# Patient Record
Sex: FEMALE | Race: WHITE | NOT HISPANIC OR LATINO | URBAN - METROPOLITAN AREA
[De-identification: names, ages, dates, MRNs, and addresses within clinical notes are randomized per-mention and may not be internally consistent; named-entity substitution may affect disease eponyms.]

---

## 2017-02-07 ENCOUNTER — COMMUNICATION - HEALTHEAST (OUTPATIENT)
Dept: SURGERY | Facility: CLINIC | Age: 62
End: 2017-02-07

## 2017-02-07 DIAGNOSIS — K21.9 ACID REFLUX: ICD-10-CM

## 2017-02-07 DIAGNOSIS — Z98.84 S/P LAPAROSCOPIC SLEEVE GASTRECTOMY: ICD-10-CM

## 2017-05-22 ENCOUNTER — COMMUNICATION - HEALTHEAST (OUTPATIENT)
Dept: SURGERY | Facility: CLINIC | Age: 62
End: 2017-05-22

## 2017-05-23 ENCOUNTER — COMMUNICATION - HEALTHEAST (OUTPATIENT)
Dept: SURGERY | Facility: CLINIC | Age: 62
End: 2017-05-23

## 2017-07-13 ENCOUNTER — OFFICE VISIT (OUTPATIENT)
Dept: ORTHOPEDICS | Facility: CLINIC | Age: 62
End: 2017-07-13
Payer: COMMERCIAL

## 2017-07-13 VITALS
WEIGHT: 150 LBS | DIASTOLIC BLOOD PRESSURE: 82 MMHG | BODY MASS INDEX: 26.58 KG/M2 | SYSTOLIC BLOOD PRESSURE: 132 MMHG | HEIGHT: 63 IN

## 2017-07-13 DIAGNOSIS — M65.311 TRIGGER FINGER OF ALL DIGITS OF BOTH HANDS: Primary | ICD-10-CM

## 2017-07-13 DIAGNOSIS — M65.312 TRIGGER FINGER OF ALL DIGITS OF BOTH HANDS: Primary | ICD-10-CM

## 2017-07-13 DIAGNOSIS — M65.351 TRIGGER FINGER OF ALL DIGITS OF BOTH HANDS: Primary | ICD-10-CM

## 2017-07-13 DIAGNOSIS — M65.332 TRIGGER FINGER OF ALL DIGITS OF BOTH HANDS: Primary | ICD-10-CM

## 2017-07-13 DIAGNOSIS — M65.352 TRIGGER FINGER OF ALL DIGITS OF BOTH HANDS: Primary | ICD-10-CM

## 2017-07-13 DIAGNOSIS — M65.331 TRIGGER FINGER OF ALL DIGITS OF BOTH HANDS: Primary | ICD-10-CM

## 2017-07-13 DIAGNOSIS — M65.321 TRIGGER FINGER OF ALL DIGITS OF BOTH HANDS: Primary | ICD-10-CM

## 2017-07-13 DIAGNOSIS — M65.322 TRIGGER FINGER OF ALL DIGITS OF BOTH HANDS: Primary | ICD-10-CM

## 2017-07-13 DIAGNOSIS — M65.342 TRIGGER FINGER OF ALL DIGITS OF BOTH HANDS: Primary | ICD-10-CM

## 2017-07-13 DIAGNOSIS — M65.342 TRIGGER RING FINGER OF LEFT HAND: ICD-10-CM

## 2017-07-13 DIAGNOSIS — M65.341 TRIGGER FINGER OF ALL DIGITS OF BOTH HANDS: Primary | ICD-10-CM

## 2017-07-13 PROCEDURE — 99204 OFFICE O/P NEW MOD 45 MIN: CPT | Mod: 25 | Performed by: FAMILY MEDICINE

## 2017-07-13 PROCEDURE — 20550 NJX 1 TENDON SHEATH/LIGAMENT: CPT | Performed by: FAMILY MEDICINE

## 2017-07-13 RX ORDER — MAGNESIUM 200 MG
1000 TABLET ORAL
COMMUNITY

## 2017-07-13 RX ORDER — METOPROLOL SUCCINATE 25 MG/1
25 TABLET, EXTENDED RELEASE ORAL
COMMUNITY
Start: 2014-02-27 | End: 2017-07-28

## 2017-07-13 RX ORDER — LEVOTHYROXINE SODIUM 50 UG/1
50 TABLET ORAL
COMMUNITY
Start: 2016-09-21 | End: 2017-07-28

## 2017-07-13 RX ORDER — CHLORAL HYDRATE 500 MG
1 CAPSULE ORAL
COMMUNITY
End: 2017-07-28

## 2017-07-13 RX ORDER — LANOLIN ALCOHOL/MO/W.PET/CERES
400 CREAM (GRAM) TOPICAL
COMMUNITY
End: 2017-07-28

## 2017-07-13 RX ORDER — CLOPIDOGREL BISULFATE 75 MG/1
75 TABLET ORAL
COMMUNITY
Start: 2014-02-27

## 2017-07-13 RX ORDER — LISINOPRIL 10 MG/1
10 TABLET ORAL
COMMUNITY
End: 2017-07-28

## 2017-07-13 RX ORDER — ATORVASTATIN CALCIUM 10 MG/1
10 TABLET, FILM COATED ORAL
COMMUNITY

## 2017-07-13 NOTE — NURSING NOTE
"Chief Complaint   Patient presents with     Musculoskeletal Problem     left ring finger - trigger finger        Initial /82  Ht 5' 3\" (1.6 m)  Wt 150 lb (68 kg)  BMI 26.57 kg/m2 Estimated body mass index is 26.57 kg/(m^2) as calculated from the following:    Height as of this encounter: 5' 3\" (1.6 m).    Weight as of this encounter: 150 lb (68 kg).  Medication Reconciliation: complete     Manuelito Whaley ATC  "

## 2017-07-13 NOTE — PROGRESS NOTES
"Ysabel Tucker  :  1955  DOS: 2017  MRN: [id number]    Sports Medicine Clinic Visit    PCP: No primary care provider on file.    Ysabel Tucker is a 62 year old Right hand dominant female who is seen as a self referral presenting with left ring and index finger pain.     Injury: Gradual onset of triggering sensation in left ring finger over last 3 months, mild trigger in index finger over last 2 weeks.  Pain located over left ring finger and index finger, nonradiating.  Additional Features:  Positive: flexion contracture.  Symptoms are better with Rest and steroid injections.  Symptoms are worse with: waking in AM, gripping/grasping objects.  Other evaluation and/or treatments so far consists of: Ibuprofen, Rest and steroid injection.  Recent imaging completed: No recent imaging completed.  Prior History of related problems: Most recent steroid injection in left ring finger in 2016 - provided ~ 3 months of relief.  H/o of trigger finger release in right index - ring fingers, left index & middle fingers by Dr Oglesby @ [facility].    Social History: works as  staff    Review of Systems  Musculoskeletal: as above  Remainder of review of systems is negative including constitutional, CV, pulmonary, GI, Skin and Neurologic except as noted in HPI or medical history.    No past medical history on file.  No past surgical history on file.    Objective  /82  Ht 5' 3\" (1.6 m)  Wt 150 lb (68 kg)  BMI 26.57 kg/m2    General: healthy, alert and in no distress    HEENT: no scleral icterus or conjunctival erythema   Skin: no suspicious lesions or rash. No jaundice.   CV: regular rhythm by palpation, 2+ distal pulses, no pedal edema    Resp: normal respiratory effort without conversational dyspnea   Psych: normal mood and affect    Gait: nonantalgic, appropriate coordination and balance   Neuro: normal light touch sensory exam of the extremities. Motor strength as noted below     Left " hand exam  Inspection:Long and index Finger:  slight swelling, over MCP, flexor tendon nodule, prior sugical scar over palmar MCP of index finger  Tender: Long and index Finger:   MCP joint, triggering, A1 Pulley  Non-tender: remainder of hand  Range of Motion Long Finger and index:   Painful full extension, triggering of A1 pulley with flexion  Strength: full strength    Procedure  After risks, benefits and complications of steroid injection were discussed with the patient, a consent form was signed and the patient elected to proceed.  Using sterile technique, the area was first prepped with betadyne and an alcohol swab.  A 25 gauge  needle was used to inject a mixture of 20 mg Kenalog  1 ml of 1% lidocaine into the left 2nd A1 pulley at the level of the MCP joint..  The patient tolerated the procedure well without complications.      Radiology:  No new imaging today    Assessment:  1. Trigger finger of all digits of both hands    2. Trigger ring finger of left hand        Plan:  Discussed the assessment with the patient.  Follow up: prn  Injection to the left 2nd trigger finger today  She has had a hand surgeon in the past managing who is no longer covered by insurance  Will consult Dr Ferreira or Carolyne from [facility] for ongoing surgical options  9 of her 10 fingers have had debridement surgery, and all have been injected multiple time.    Left 4th digit is most focal, has been injected multiple times, sore nodule at MCP and PIP, will discuss surical options  Expectations and goals of CSI reviewed  Often 2-3 days for steroid effect, and can take up to two weeks for maximum effect  We discussed modified progressive pain-free activity as tolerated  Do not overuse in first two weeks if feeling better due to concern for vulnerability while steroid is working  Supportive care reviewed  All questions were answered today  Contact us with additional questions or concerns  Signs and sx of concern reviewed      Joni Quiles DO,  [facility]  Primary Care Sports Medicine  [facility]             Disclaimer: This note consists of symbols derived from keyboarding, dictation and/or voice recognition software. As a result, there may be errors in the script that have gone undetected. Please consider this when interpreting information found in this chart.

## 2017-07-13 NOTE — MR AVS SNAPSHOT
After Visit Summary   7/13/2017    Ysabel Tucker    MRN: [id number]           Patient Information     Date Of Birth          1955        Visit Information        Provider Department      7/13/2017 3:20 PM Joni Quiles DO [facility]        Today's Diagnoses     Trigger finger of all digits of both hands    -  1    Trigger ring finger of left hand           Follow-ups after your visit        Additional Services     ORTHO  REFERRAL       [facility] is referring you to the Orthopedic  Services at [facility].       The  Representative will assist you in the coordination of your Orthopedic and Musculoskeletal Care as prescribed by your physician.    The  Representative will call you within 1 business day to help schedule your appointment, or you may contact the  Representative at:    All areas ~ [phone]     Type of Referral : Surgical / Specialist - Hand Surgeon, Dr Barfield or Dr Ferreira      Timeframe requested: Routine    Coverage of these services is subject to the terms and limitations of your health insurance plan.  Please call member services at your health plan with any benefit or coverage questions.      If X-rays, CT or MRI's have been performed, please contact the facility where they were done to arrange for , prior to your scheduled appointment.  Please bring this referral request to your appointment and present it to your specialist.                  Who to contact     If you have questions or need follow up information about today's clinic visit or your schedule please contact [facility] directly at [phone].  Normal or non-critical lab and imaging results will be communicated to you by MyChart, letter or phone within 4 business days after the clinic has received the results. If you do not hear from us within 7  "days, please contact the clinic through [facility]PU or phone. If you have a critical or abnormal lab result, we will notify you by phone as soon as possible.  Submit refill requests through ARPU or call your pharmacy and they will forward the refill request to us. Please allow 3 business days for your refill to be completed.          Additional Information About Your Visit        ARPU Information     ARPU lets you send messages to your doctor, view your test results, renew your prescriptions, schedule appointments and more. To sign up, go to [email or web address] . Click on \"Log in\" on the left side of the screen, which will take you to the Welcome page. Then click on \"Sign up Now\" on the right side of the page.     You will be asked to enter the access code listed below, as well as some personal information. Please follow the directions to create your username and password.     Your access code is: [email or web address]  Expires: 10/12/2017 12:04 PM     Your access code will  in 90 days. If you need help or a new code, please call your [place] clinic or [phone].        Care EveryWhere ID     This is your Care EveryWhere ID. This could be used by other organizations to access your [place] medical records  [id number]        Your Vitals Were     Height BMI (Body Mass Index)                5' 3\" (1.6 m) 26.57 kg/m2           Blood Pressure from Last 3 Encounters:   17 132/82    Weight from Last 3 Encounters:   17 150 lb (68 kg)              We Performed the Following     INJECTION SINGLE TENDON SHEATH/LIGAMENT     ORTHO  REFERRAL     TRIAMCINOLONE ACET INJ NOS          Today's Medication Changes          These changes are accurate as of: 17 11:59 PM.  If you have any questions, ask your nurse or doctor.               Start taking these medicines.        Dose/Directions    triamcinolone acetonide 40 MG/ML injection   Commonly known as:  KENALOG-40   Used for:  Trigger ring finger " of left hand        Dose:  20 mg   Inject 0.5 mLs (20 mg) into the muscle once for 1 dose   Quantity:  0.5 mL   Refills:  0            Where to get your medicines      Some of these will need a paper prescription and others can be bought over the counter.  Ask your nurse if you have questions.     You don't need a prescription for these medications     triamcinolone acetonide 40 MG/ML injection                Primary Care Provider    None Specified       No primary provider on file.        Equal Access to Services     ARDEN CARPENTER : Lisha Moses, aram rand, qalaine kaalmaanastasiya booker, faith giraldorosalindazeny gonzalez . So [facility] [phone].    ATENCIÓN: Si chu calderon, tiene a jovel disposición servicios gratuitos de asistencia lingüística. Llame al [phone].    We comply with applicable federal civil rights laws and [place] laws. We do not discriminate on the basis of race, color, national origin, age, disability sex, sexual orientation or gender identity.            Thank you!     Thank you for choosing [facility]  for your care. Our goal is always to provide you with excellent care. Hearing back from our patients is one way we can continue to improve our services. Please take a few minutes to complete the written survey that you may receive in the mail after your visit with us. Thank you!             Your Updated Medication List - Protect others around you: Learn how to safely use, store and throw away your medicines at www.disposemymeds.org.          This list is accurate as of: 7/13/17 11:59 PM.  Always use your most recent med list.                   Brand Name Dispense Instructions for use Diagnosis    atorvastatin 10 MG tablet    LIPITOR     Take 10 mg by mouth        clopidogrel 75 MG tablet    PLAVIX     Take 75 mg by mouth        cyanocobalamin 1000 MCG Subl sublingual tablet      Place 1,000 mcg under the tongue        fish oil-omega-3  fatty acids 1000 MG capsule      Take 1 capsule by mouth        folic acid 400 MCG tablet    FOLVITE     Take 400 mcg by mouth        levothyroxine 50 MCG tablet    SYNTHROID/LEVOTHROID     Take 50 mcg by mouth        lisinopril 10 MG tablet    PRINIVIL/ZESTRIL     Take 10 mg by mouth        metFORMIN 1000 MG tablet    GLUCOPHAGE     Take 1,000 mg by mouth        metoprolol 25 MG 24 hr tablet    TOPROL-XL     Take 25 mg by mouth        triamcinolone acetonide 40 MG/ML injection    KENALOG-40    0.5 mL    Inject 0.5 mLs (20 mg) into the muscle once for 1 dose    Trigger ring finger of left hand

## 2017-07-14 RX ORDER — TRIAMCINOLONE ACETONIDE 40 MG/ML
20 INJECTION, SUSPENSION INTRA-ARTICULAR; INTRAMUSCULAR ONCE
Qty: 0.5 ML | Refills: 0 | OUTPATIENT
Start: 2017-07-14 | End: 2017-07-14

## 2017-07-20 ENCOUNTER — TRANSFERRED RECORDS (OUTPATIENT)
Dept: HEALTH INFORMATION MANAGEMENT | Facility: CLINIC | Age: 62
End: 2017-07-20

## 2017-07-28 RX ORDER — LEVOTHYROXINE SODIUM 75 UG/1
75 TABLET ORAL DAILY
Refills: 1 | COMMUNITY
Start: 2017-05-11

## 2017-07-28 RX ORDER — METOPROLOL TARTRATE 50 MG
50 TABLET ORAL
COMMUNITY
Start: 2016-11-22

## 2017-07-28 RX ORDER — AMLODIPINE BESYLATE 10 MG/1
10 TABLET ORAL
COMMUNITY

## 2017-07-28 RX ORDER — FUROSEMIDE 20 MG
20 TABLET ORAL
COMMUNITY

## 2017-07-28 RX ORDER — TRAMADOL HYDROCHLORIDE 50 MG/1
50-100 TABLET ORAL
COMMUNITY
Start: 2015-07-14

## 2017-08-01 ENCOUNTER — HOSPITAL ENCOUNTER (OUTPATIENT)
Facility: CLINIC | Age: 62
Discharge: HOME | End: 2017-08-01
Attending: ORTHOPAEDIC SURGERY | Admitting: ORTHOPAEDIC SURGERY
Payer: COMMERCIAL

## 2017-08-01 ENCOUNTER — SURGERY (OUTPATIENT)
Age: 62
End: 2017-08-01

## 2017-08-01 VITALS
TEMPERATURE: 98 F | HEART RATE: 58 BPM | RESPIRATION RATE: 16 BRPM | OXYGEN SATURATION: 99 % | SYSTOLIC BLOOD PRESSURE: 135 MMHG | HEIGHT: 63 IN | BODY MASS INDEX: 26.58 KG/M2 | WEIGHT: 150 LBS | DIASTOLIC BLOOD PRESSURE: 87 MMHG

## 2017-08-01 LAB — GLUCOSE BLDC GLUCOMTR-MCNC: 140 MG/DL (ref 70–99)

## 2017-08-01 PROCEDURE — 27210794 ZZH OR GENERAL SUPPLY STERILE: Performed by: ORTHOPAEDIC SURGERY

## 2017-08-01 PROCEDURE — 25000128 H RX IP 250 OP 636: Performed by: PHYSICIAN ASSISTANT

## 2017-08-01 PROCEDURE — 40000305 ZZH STATISTIC PRE PROC ASSESS I: Performed by: ORTHOPAEDIC SURGERY

## 2017-08-01 PROCEDURE — 71000027 ZZH RECOVERY PHASE 2 EACH 15 MINS: Performed by: ORTHOPAEDIC SURGERY

## 2017-08-01 PROCEDURE — 82962 GLUCOSE BLOOD TEST: CPT

## 2017-08-01 PROCEDURE — 36000050 ZZH SURGERY LEVEL 2 1ST 30 MIN: Performed by: ORTHOPAEDIC SURGERY

## 2017-08-01 PROCEDURE — 25000125 ZZHC RX 250: Performed by: ORTHOPAEDIC SURGERY

## 2017-08-01 PROCEDURE — S0020 INJECTION, BUPIVICAINE HYDRO: HCPCS | Performed by: ORTHOPAEDIC SURGERY

## 2017-08-01 RX ORDER — CEFAZOLIN SODIUM 2 G/100ML
2 INJECTION, SOLUTION INTRAVENOUS
Status: COMPLETED | OUTPATIENT
Start: 2017-08-01 | End: 2017-08-01

## 2017-08-01 RX ORDER — BACITRACIN ZINC 500 [USP'U]/G
OINTMENT TOPICAL PRN
Status: DISCONTINUED | OUTPATIENT
Start: 2017-08-01 | End: 2017-08-01 | Stop reason: HOSPADM

## 2017-08-01 RX ORDER — BUPIVACAINE HYDROCHLORIDE 5 MG/ML
INJECTION, SOLUTION PERINEURAL PRN
Status: DISCONTINUED | OUTPATIENT
Start: 2017-08-01 | End: 2017-08-01 | Stop reason: HOSPADM

## 2017-08-01 RX ORDER — LIDOCAINE HYDROCHLORIDE 10 MG/ML
INJECTION, SOLUTION INFILTRATION; PERINEURAL PRN
Status: DISCONTINUED | OUTPATIENT
Start: 2017-08-01 | End: 2017-08-01 | Stop reason: HOSPADM

## 2017-08-01 RX ORDER — CEFAZOLIN SODIUM 1 G/3ML
1 INJECTION, POWDER, FOR SOLUTION INTRAMUSCULAR; INTRAVENOUS SEE ADMIN INSTRUCTIONS
Status: DISCONTINUED | OUTPATIENT
Start: 2017-08-01 | End: 2017-08-01 | Stop reason: HOSPADM

## 2017-08-01 RX ADMIN — BUPIVACAINE HYDROCHLORIDE 2.5 ML: 5 INJECTION, SOLUTION PERINEURAL at 13:50

## 2017-08-01 RX ADMIN — CEFAZOLIN SODIUM 2 G: 2 INJECTION, SOLUTION INTRAVENOUS at 13:32

## 2017-08-01 RX ADMIN — OSELTAMIVIR PHOSPHATE 14 G: 75 CAPSULE ORAL at 13:50

## 2017-08-01 RX ADMIN — LIDOCAINE HYDROCHLORIDE 2.5 ML: 10 INJECTION, SOLUTION INFILTRATION; PERINEURAL at 13:51

## 2017-08-01 NOTE — OP NOTE
Date of Procedure: 08/01/17     Pre-operative Diagnosis:  Left ring finger stenosing tenosynovitis   Post-operative Diagnosis: same    Procedures performed:  Left ring finger A1 pulley release    Surgeon: Christina Barfield M.D.  Assistants: Sherrie Houston     Anesthesia:  Local anesthetic for a digital block to the left ring finger  Specimens: none   EBL: minimal  Drains: none   Complications: none known    Findings: No residual triggering with active flexion of the digit    Indications for Surgery: Ysabel Tucker is a 62 year old female with a history of left ring finger trigger digit that has failed to respond to conservative treatment including previous corticosteroid injections.  I spoke to the patient about the risks, benefits, and alternatives of continued conservative treatment verses surgical intervention.  The patient understands the risks of surgery include, but are not limited to the following: bleeding, infection, damage to the adjacent neurovascular structures, persistent or recurrent triggering, numbness, weakness, chronic pain, wound healing problems, failure to relieve all of the symptoms, worsening symptoms, recurrence of symptoms, stiffness, need for further surgery despite our efforts today, DVT or PE, stroke, heart attack, loss of limb or life.  No guarantees were implied or made.  The patient expressed understanding and wished to proceed with surgery.    Procedure:  The patient was identified in the holding area and the correct surgical site, the left ring finger, was identified and marked.  The patient was then brought to the operating room and placed supine on the operating room table where all pressure points were padded.  Perioperative pause confirmed the correct patient, the correct extremity and the correct procedure.  The area over the base of the left ring finger  finger was anesthetized with a digital block using a 1:1 combination of 1% lidocaine and 0.5% marcaine plain.  A nonsterile  tourniquet was applied to the left arm, which was then prepped and draped in the standard sterile fashion.  The limb was then elevated, exsanguinated and the tourniquet inflated to 250 mmHg.     Attention was then turned to the left ring finger.  A longitudinal incision was made over the A1 pulley.  Hemostasis was obtained with bipolar electrocautery. Blunt dissection was carried down through the subcutaneous tissues, taking care to protect the neurovascular bundles radially and ulnarly.  The neurovascular bundles were carefully retracted to reveal the underlying flexor tendon sheath.  The A1 pulley was visualized and sharply transected longitudinally along its full length, taking care to protect the A2 pulley and confirm full release of the A1 pulley.  There was thickening of the A1 pulley.   The flexor tendons were individually drawn out of the wound with a Ragnell and inspected.  Flexor tendons showed a very mild amount of fraying of the superficial most fibers of the FDS tendon, corresponding to roughly 10% of the total surface area.  The patient was then asked to voluntarily flex and extend the digit multiple times.  There was no triggering of the digit and the tendon gliding was smooth.  Careful inspection of the tendon demonstrated no evidence of residual constriction.      The tourniquet was then deflated after 8 minutes with brisk return of capillary refill to all digits.  The wound was thoroughly irrigated with copious amounts of sterile saline.  Meticulous hemostasis was achieved with electrocautery.   The skin was closed with 4-0 nylon.  Sterile dressings consisting of bacitracin, Adaptic, 4 x 4's, sterile webril, and an Ace wrap were applied.  The patient was then taken to the recovery room in stable condition, having tolerated the procedure well.  Sponge, needle and instrument counts were correct at the conclusion of the procedure, which was performed under loupe magnification.    Postoperative Plan:  Begin active ROM.  Return to clinic in 10-14 days for wound check and suture removal as healing allows.

## 2017-08-01 NOTE — DISCHARGE INSTRUCTIONS
Same Day Surgery Discharge Instructions  Special Precautions After Surgery - Adult    1. It is not unusual to feel lightheaded or faint, up to 24 hours after surgery or while taking pain medication.  If you have these symptoms; sit for a few minutes before standing and have someone assist you when getting up.  2. You should rest and relax for the next 24 hours and must have someone stay with you for at least 24 hours after your discharge.  3. DO NOT DRIVE any vehicle or operate mechanical equipment for 24 hours following the end of your surgery.  DO NOT DRIVE while taking narcotic pain medications that have been prescribed by your physician.  If you had a limb operated on, you must be able to use it fully to drive.  4. DO NOT drink alcoholic beverages for 24 hours following surgery or while taking prescription pain medication.  5. Drink clear liquids (apple juice, ginger ale, broth, 7-Up, etc.).  Progress to your regular diet as you feel able.  6. Any questions call your physician and do not make important decisions for 24 hours.  __________________________________________________________________________________________________________________________________  IMPORTANT NUMBERS:    [facility] Main Number:  [phone], [phone]  Pharmacy:  [phone]  Same Day Surgery:  [phone], Monday - Friday until 8:30 p.m.  Urgent Care:  [phone]  Emergency Room:  [phone]      [facility]:  [phone]                                                                             [facility]:  [phone] option 1  [facility]:  [phone]     [facility]:  [phone]   [facility]:  [phone]   Home Medical Equipment: [phone]  [facility]:  [phone]

## 2017-08-01 NOTE — IP AVS SNAPSHOT
MRN:[id number]                      After Visit Summary   8/1/2017    Ysabel Tucker    MRN: [id number]           Thank you!     Thank you for choosing [facility] for your care. Our goal is always to provide you with excellent care. Hearing back from our patients is one way we can continue to improve our services. Please take a few minutes to complete the written survey that you may receive in the mail after you visit with us. Thank you!        Patient Information     Date Of Birth          1955        About your hospital stay     You were admitted on:  August 1, 2017 You last received care in the:  [facility] PreOP/Phase II    You were discharged on:  August 1, 2017        Reason for your hospital stay       Trigger finger                  Who to Call     For medical emergencies, please call 911.  For non-urgent questions about your medical care, please call your primary care provider or clinic, [phone]  For questions related to your surgery, please call your surgery clinic        Attending Provider     Provider Specialty    Christina Barfield MD Orthopedics       Primary Care Provider Office Phone # Fax #    Chris Power [phone]       When to contact your care team       Call your primary doctor if you have any of the following: chest pain, troubles breathing, increased shortness of breath.  Call [facility] ([phone]) if you have any of the following: temperature greater than 100.5F, pain not controlled with elevation or pain medications, drainage that saturates the bandage.                  After Care Instructions     Activity       Keep your arm elevated above your heart for the next 2-3 days.  This will limit swelling and help with pain control.  It is ok to apply an ice bag to the area, but make sure there is no leak or chance for condensation to cause your dressing to get damp.  Wet dressings can lead to infection.  Keep your sterile  surgical dressing clean and dry.  Please do no remove.  Sponge bathing is recommended.  Otherwise, cover your arm with plastic bags secured around the upper arm if showering and hold your arm outside of the shower.  OK to move your fingers, wrist, and elbow.  No lifting, pushing, pulling more than 10 lbs with the surgical extremity.  No repetitive activities with the surgical hand/wrist.            Diet       Resume your pre-op  diet                  Follow-up Appointments     Follow-up and recommended labs and tests        Follow up with Dr. Barfield in 2 weeks at [facility] ([phone]).  You may need to call to schedule this appointment if you do not already have a follow-up appointment scheduled.                  Further instructions from your care team                           Same Day Surgery Discharge Instructions  Special Precautions After Surgery - Adult    1. It is not unusual to feel lightheaded or faint, up to 24 hours after surgery or while taking pain medication.  If you have these symptoms; sit for a few minutes before standing and have someone assist you when getting up.  2. You should rest and relax for the next 24 hours and must have someone stay with you for at least 24 hours after your discharge.  3. DO NOT DRIVE any vehicle or operate mechanical equipment for 24 hours following the end of your surgery.  DO NOT DRIVE while taking narcotic pain medications that have been prescribed by your physician.  If you had a limb operated on, you must be able to use it fully to drive.  4. DO NOT drink alcoholic beverages for 24 hours following surgery or while taking prescription pain medication.  5. Drink clear liquids (apple juice, ginger ale, broth, 7-Up, etc.).  Progress to your regular diet as you feel able.  6. Any questions call your physician and do not make important decisions for 24  "hours.  __________________________________________________________________________________________________________________________________  IMPORTANT NUMBERS:    [facility] Main Number:  [phone], [phone]  Pharmacy:  [phone]  Same Day Surgery:  [phone], Monday - Friday until 8:30 p.m.  Urgent Care:  [phone]  Emergency Room:  [phone]      [facility]:  [phone]                                                                             [facility]:  [phone] option 1  [facility]:  [phone]     OB Clinic:  [phone]   Surgery Specialty Clinic:  [phone]   Home Medical Equipment: [phone]  [facility]:  [phone]        Pending Results     No orders found from 2017 to 2017.            Admission Information     Date & Time Provider Department Dept. Phone    2017 Christina Barfield MD [facility] PreOP/Phase [phone]      Your Vitals Were     Blood Pressure Pulse Temperature Respirations Height Weight    156/104 57 98  F (36.7  C) (Oral) 18 1.6 m (5' 3\") 68 kg (150 lb)    Pulse Oximetry BMI (Body Mass Index)                99% 26.57 kg/m2          MyChart Information     [employer] lets you send messages to your doctor, view your test results, renew your prescriptions, schedule appointments and more. To sign up, go to [email or web address] . Click on \"Log in\" on the left side of the screen, which will take you to the Welcome page. Then click on \"Sign up Now\" on the right side of the page.     You will be asked to enter the access code listed below, as well as some personal information. Please follow the directions to create your username and password.     Your access code is: [email or web address]  Expires: 10/12/2017 12:04 PM     Your access code will  in 90 days. If you need help or a new code, please call your [facility] or [phone].        Care EveryWhere ID     This is your Care " EveryWhere ID. This could be used by other organizations to access your [facility] medical records  [id number]        Equal Access to Services     ARDEN CARPENTER : Lisha Moses, aram rand, carson birminghammaanastasiya booker, faith giraldorosalindazeny stevens. So [facility] [phone].    ATENCIÓN: Si habla español, tiene a jovel disposición servicios gratuitos de asistencia lingüística. Llame al [phone].    We comply with applicable federal civil rights laws and [place] laws. We do not discriminate on the basis of race, color, national origin, age, disability sex, sexual orientation or gender identity.               Review of your medicines      CONTINUE these medicines which have NOT CHANGED        Dose / Directions    amLODIPine 10 MG tablet   Commonly known as:  NORVASC        Dose:  10 mg   Take 10 mg by mouth   Refills:  0       atorvastatin 10 MG tablet   Commonly known as:  LIPITOR        Dose:  10 mg   Take 10 mg by mouth   Refills:  0       clopidogrel 75 MG tablet   Commonly known as:  PLAVIX        Dose:  75 mg   Take 75 mg by mouth   Refills:  0       cyanocobalamin 1000 MCG Subl sublingual tablet        Dose:  1000 mcg   Place 1,000 mcg under the tongue   Refills:  0       furosemide 20 MG tablet   Commonly known as:  LASIX        Dose:  20 mg   Take 20 mg by mouth   Refills:  0       levothyroxine 75 MCG tablet   Commonly known as:  SYNTHROID/LEVOTHROID        Dose:  75 mcg   Take 75 mcg by mouth daily   Refills:  1       metFORMIN 1000 MG tablet   Commonly known as:  GLUCOPHAGE        Take by mouth 2 times daily (with meals)   Refills:  0       metoprolol 50 MG tablet   Commonly known as:  LOPRESSOR        Dose:  50 mg   Take 50 mg by mouth   Refills:  0       ranitidine 75 MG tablet   Commonly known as:  ZANTAC        Dose:  75 mg   Take 75 mg by mouth   Refills:  0       traMADol 50 MG tablet   Commonly known as:  ULTRAM        Dose:   mg   Take  mg by mouth    Refills:  0                Protect others around you: Learn how to safely use, store and throw away your medicines at www.disposemymeds.org.             Medication List: This is a list of all your medications and when to take them. Check marks below indicate your daily home schedule. Keep this list as a reference.      Medications           Morning Afternoon Evening Bedtime As Needed    amLODIPine 10 MG tablet   Commonly known as:  NORVASC   Take 10 mg by mouth                                atorvastatin 10 MG tablet   Commonly known as:  LIPITOR   Take 10 mg by mouth                                clopidogrel 75 MG tablet   Commonly known as:  PLAVIX   Take 75 mg by mouth                                cyanocobalamin 1000 MCG Subl sublingual tablet   Place 1,000 mcg under the tongue                                furosemide 20 MG tablet   Commonly known as:  LASIX   Take 20 mg by mouth                                levothyroxine 75 MCG tablet   Commonly known as:  SYNTHROID/LEVOTHROID   Take 75 mcg by mouth daily                                metFORMIN 1000 MG tablet   Commonly known as:  GLUCOPHAGE   Take by mouth 2 times daily (with meals)                                metoprolol 50 MG tablet   Commonly known as:  LOPRESSOR   Take 50 mg by mouth                                ranitidine 75 MG tablet   Commonly known as:  ZANTAC   Take 75 mg by mouth                                traMADol 50 MG tablet   Commonly known as:  ULTRAM   Take  mg by mouth

## 2017-08-01 NOTE — IP AVS SNAPSHOT
[facility] PreOP/Phase II    [address] [address]    Phone:  [phone]    Fax:  [phone]                                       After Visit Summary   8/1/2017    Ysabel Tucker    MRN: [id number]           After Visit Summary Signature Page     I have received my discharge instructions, and my questions have been answered. I have discussed any challenges I see with this plan with the nurse or doctor.    ..........................................................................................................................................  Patient/Patient Representative Signature      ..........................................................................................................................................  Patient Representative Print Name and Relationship to Patient    ..................................................               ................................................  Date                                            Time    ..........................................................................................................................................  Reviewed by Signature/Title    ...................................................              ..............................................  Date                                                            Time

## 2018-01-04 ENCOUNTER — COMMUNICATION - HEALTHEAST (OUTPATIENT)
Dept: SURGERY | Facility: CLINIC | Age: 63
End: 2018-01-04

## 2018-01-18 ENCOUNTER — COMMUNICATION - HEALTHEAST (OUTPATIENT)
Dept: ADMINISTRATIVE | Facility: CLINIC | Age: 63
End: 2018-01-18

## 2018-02-12 ENCOUNTER — COMMUNICATION - HEALTHEAST (OUTPATIENT)
Dept: SURGERY | Facility: CLINIC | Age: 63
End: 2018-02-12

## 2018-06-28 ENCOUNTER — AMBULATORY - HEALTHEAST (OUTPATIENT)
Dept: CARDIOLOGY | Facility: CLINIC | Age: 63
End: 2018-06-28

## 2018-07-03 ENCOUNTER — OFFICE VISIT - HEALTHEAST (OUTPATIENT)
Dept: CARDIOLOGY | Facility: CLINIC | Age: 63
End: 2018-07-03

## 2018-07-03 DIAGNOSIS — I25.9 CHEST PAIN DUE TO MYOCARDIAL ISCHEMIA: ICD-10-CM

## 2018-07-03 DIAGNOSIS — I10 ESSENTIAL HYPERTENSION, BENIGN: ICD-10-CM

## 2018-07-03 ASSESSMENT — MIFFLIN-ST. JEOR: SCORE: 1179.99

## 2018-07-17 ENCOUNTER — HOSPITAL ENCOUNTER (OUTPATIENT)
Dept: NUCLEAR MEDICINE | Facility: HOSPITAL | Age: 63
Discharge: HOME | End: 2018-07-17
Attending: INTERNAL MEDICINE

## 2018-07-17 ENCOUNTER — HOSPITAL ENCOUNTER (OUTPATIENT)
Dept: CARDIOLOGY | Facility: HOSPITAL | Age: 63
Discharge: HOME | End: 2018-07-17
Attending: INTERNAL MEDICINE

## 2018-07-17 DIAGNOSIS — I20.9 ANGINA PECTORIS, UNSPECIFIED (H): ICD-10-CM

## 2018-07-17 DIAGNOSIS — I25.9 CHEST PAIN DUE TO MYOCARDIAL ISCHEMIA: ICD-10-CM

## 2018-07-17 DIAGNOSIS — I10 ESSENTIAL HYPERTENSION, BENIGN: ICD-10-CM

## 2018-07-17 LAB
CV STRESS CURRENT BP HE: NORMAL
CV STRESS CURRENT HR HE: 100
CV STRESS CURRENT HR HE: 101
CV STRESS CURRENT HR HE: 102
CV STRESS CURRENT HR HE: 103
CV STRESS CURRENT HR HE: 104
CV STRESS CURRENT HR HE: 104
CV STRESS CURRENT HR HE: 107
CV STRESS CURRENT HR HE: 109
CV STRESS CURRENT HR HE: 114
CV STRESS CURRENT HR HE: 114
CV STRESS CURRENT HR HE: 115
CV STRESS CURRENT HR HE: 116
CV STRESS CURRENT HR HE: 116
CV STRESS CURRENT HR HE: 117
CV STRESS CURRENT HR HE: 118
CV STRESS CURRENT HR HE: 120
CV STRESS CURRENT HR HE: 121
CV STRESS CURRENT HR HE: 123
CV STRESS CURRENT HR HE: 123
CV STRESS CURRENT HR HE: 124
CV STRESS CURRENT HR HE: 125
CV STRESS CURRENT HR HE: 125
CV STRESS CURRENT HR HE: 127
CV STRESS CURRENT HR HE: 129
CV STRESS CURRENT HR HE: 86
CV STRESS CURRENT HR HE: 87
CV STRESS CURRENT HR HE: 93
CV STRESS CURRENT HR HE: 94
CV STRESS CURRENT HR HE: 95
CV STRESS CURRENT HR HE: 96
CV STRESS CURRENT HR HE: 97
CV STRESS CURRENT HR HE: 97
CV STRESS CURRENT HR HE: 99
CV STRESS DEVIATION TIME HE: NORMAL
CV STRESS ECHO PERCENT HR HE: NORMAL
CV STRESS EXERCISE STAGE HE: NORMAL
CV STRESS FINAL RESTING BP HE: NORMAL
CV STRESS FINAL RESTING HR HE: 103
CV STRESS MAX HR HE: 130
CV STRESS MAX TREADMILL GRADE HE: 0
CV STRESS MAX TREADMILL SPEED HE: 0
CV STRESS PEAK DIA BP HE: NORMAL
CV STRESS PEAK SYS BP HE: NORMAL
CV STRESS PHASE HE: NORMAL
CV STRESS PROTOCOL HE: NORMAL
CV STRESS RESTING PT POSITION HE: NORMAL
CV STRESS ST DEVIATION AMOUNT HE: NORMAL
CV STRESS ST DEVIATION ELEVATION HE: NORMAL
CV STRESS ST EVELATION AMOUNT HE: NORMAL
CV STRESS TEST TYPE HE: NORMAL
CV STRESS TOTAL STAGE TIME MIN 1 HE: NORMAL
NUC STRESS EJECTION FRACTION: 38 %
STRESS ECHO BASELINE BP: NORMAL
STRESS ECHO BASELINE HR: 73
STRESS ECHO CALCULATED PERCENT HR: 83 %
STRESS ECHO LAST STRESS BP: NORMAL
STRESS ECHO LAST STRESS HR: 120

## 2018-07-18 ENCOUNTER — SURGERY - HEALTHEAST (OUTPATIENT)
Dept: CARDIOLOGY | Facility: CLINIC | Age: 63
End: 2018-07-18

## 2018-07-19 ASSESSMENT — MIFFLIN-ST. JEOR: SCORE: 1180.9

## 2018-08-02 ENCOUNTER — OFFICE VISIT - HEALTHEAST (OUTPATIENT)
Dept: CARDIOLOGY | Facility: CLINIC | Age: 63
End: 2018-08-02

## 2018-08-02 DIAGNOSIS — E78.5 DYSLIPIDEMIA: ICD-10-CM

## 2018-08-02 DIAGNOSIS — I25.5 ISCHEMIC CARDIOMYOPATHY: ICD-10-CM

## 2018-08-02 DIAGNOSIS — I25.10 CORONARY ARTERY DISEASE: ICD-10-CM

## 2018-08-02 ASSESSMENT — MIFFLIN-ST. JEOR: SCORE: 1184.53

## 2018-10-05 ENCOUNTER — RECORDS - HEALTHEAST (OUTPATIENT)
Dept: ADMINISTRATIVE | Facility: OTHER | Age: 63
End: 2018-10-05

## 2018-10-09 ENCOUNTER — OFFICE VISIT - HEALTHEAST (OUTPATIENT)
Dept: CARDIOLOGY | Facility: CLINIC | Age: 63
End: 2018-10-09

## 2018-10-09 DIAGNOSIS — I21.4 NSTEMI (NON-ST ELEVATED MYOCARDIAL INFARCTION) (H): ICD-10-CM

## 2018-10-09 DIAGNOSIS — I25.10 CORONARY ARTERY DISEASE INVOLVING NATIVE CORONARY ARTERY OF NATIVE HEART WITHOUT ANGINA PECTORIS: ICD-10-CM

## 2018-10-09 DIAGNOSIS — I10 ESSENTIAL HYPERTENSION, BENIGN: ICD-10-CM

## 2018-10-09 DIAGNOSIS — I50.22 CHRONIC SYSTOLIC HEART FAILURE (H): ICD-10-CM

## 2018-10-09 DIAGNOSIS — R94.39 ABNORMAL NUCLEAR STRESS TEST: ICD-10-CM

## 2018-10-09 ASSESSMENT — MIFFLIN-ST. JEOR: SCORE: 1161.85

## 2020-05-29 ENCOUNTER — RECORDS - HEALTHEAST (OUTPATIENT)
Dept: ADMINISTRATIVE | Facility: OTHER | Age: 65
End: 2020-05-29

## 2020-06-10 ENCOUNTER — AMBULATORY - HEALTHEAST (OUTPATIENT)
Dept: SCHEDULING | Facility: CLINIC | Age: 65
End: 2020-06-10

## 2020-06-10 DIAGNOSIS — Z78.0 POST-MENOPAUSAL: ICD-10-CM

## 2020-06-10 DIAGNOSIS — Z13.820 OSTEOPOROSIS SCREENING: ICD-10-CM

## 2020-07-02 ENCOUNTER — HOSPITAL ENCOUNTER (OUTPATIENT)
Dept: MAMMOGRAPHY | Facility: CLINIC | Age: 65
Discharge: HOME | End: 2020-07-02

## 2020-07-02 DIAGNOSIS — Z12.31 VISIT FOR SCREENING MAMMOGRAM: ICD-10-CM

## 2020-07-30 ENCOUNTER — COMMUNICATION - HEALTHEAST (OUTPATIENT)
Dept: CARDIOLOGY | Facility: CLINIC | Age: 65
End: 2020-07-30

## 2021-05-25 ENCOUNTER — RECORDS - HEALTHEAST (OUTPATIENT)
Dept: ADMINISTRATIVE | Facility: CLINIC | Age: 66
End: 2021-05-25

## 2021-05-27 ENCOUNTER — RECORDS - HEALTHEAST (OUTPATIENT)
Dept: ADMINISTRATIVE | Facility: CLINIC | Age: 66
End: 2021-05-27

## 2021-05-28 ENCOUNTER — RECORDS - HEALTHEAST (OUTPATIENT)
Dept: ADMINISTRATIVE | Facility: CLINIC | Age: 66
End: 2021-05-28

## 2021-05-29 ENCOUNTER — RECORDS - HEALTHEAST (OUTPATIENT)
Dept: ADMINISTRATIVE | Facility: CLINIC | Age: 66
End: 2021-05-29

## 2021-05-31 ENCOUNTER — RECORDS - HEALTHEAST (OUTPATIENT)
Dept: ADMINISTRATIVE | Facility: CLINIC | Age: 66
End: 2021-05-31

## 2021-06-01 VITALS — WEIGHT: 145 LBS | HEIGHT: 63 IN | BODY MASS INDEX: 25.69 KG/M2

## 2021-06-01 VITALS — HEIGHT: 63 IN | WEIGHT: 149.2 LBS | BODY MASS INDEX: 26.44 KG/M2

## 2021-06-01 VITALS — WEIGHT: 149 LBS | BODY MASS INDEX: 26.4 KG/M2 | HEIGHT: 63 IN

## 2021-06-01 VITALS — HEIGHT: 63 IN | BODY MASS INDEX: 26.58 KG/M2 | WEIGHT: 150 LBS

## 2021-06-09 ENCOUNTER — RECORDS - HEALTHEAST (OUTPATIENT)
Dept: CARDIOLOGY | Facility: CLINIC | Age: 66
End: 2021-06-09

## 2021-06-09 DIAGNOSIS — I25.5 ISCHEMIC CARDIOMYOPATHY: ICD-10-CM

## 2021-06-09 DIAGNOSIS — R94.39 ABNORMAL RESULT OF OTHER CARDIOVASCULAR FUNCTION STUDY: ICD-10-CM

## 2021-06-09 DIAGNOSIS — I20.9 ANGINA PECTORIS, UNSPECIFIED (H): ICD-10-CM

## 2021-06-09 DIAGNOSIS — I20.0 UNSTABLE ANGINA (H): ICD-10-CM

## 2021-06-10 NOTE — TELEPHONE ENCOUNTER
Signed form from [facility] completed and return faxed to [facility]. Will have completed form scanned into media.CMM,Rn

## 2021-06-10 NOTE — TELEPHONE ENCOUNTER
Incoming fax from [facility]. Request to HOLD Plavix for 7 days prior to a lumbar nerve root injection. Pt hasn't arranged as of yet. Will arrange once request addressed. Will fax to [facility] to have [facility] sign if agreeable.    Dr. Bloom, ok for 7 day HOLD of plavix?   Also, will send message to scheduling team to arrange overdue OV with Dr. Bloom. Thank you Isacc JORDAN

## 2021-06-16 PROBLEM — R94.39 ABNORMAL NUCLEAR STRESS TEST: Status: ACTIVE | Noted: 2018-07-17

## 2021-06-16 PROBLEM — I25.10 CORONARY ARTERY DISEASE: Status: ACTIVE | Noted: 2018-08-02

## 2021-06-19 NOTE — PROGRESS NOTES
Nuclear Lexiscan Stress Test:   Patient with significant cardiac history had complaints of chest pressure rated 5/10 with Lexiscan stress test. RN offered patient Ntg SL to resolve CP but patient refused so IV caffeine administered to patient to aid in reversal of Lexiscan. Patient's CP persisted and Ntg was again offered to patient a few more times but patient continued to refuse stating that she did not like the side effects of Ntg. BP and HR stable during test and in recovery, patient's EKG at baseline showed ST segment changes with frequent PVCs so EKG with stress test was non-diagnostic. After 22min recovery, patient laying down to rest, and with O2 2L NC, patient's CP eventually resolved. Nuclear images came back abnormal so supervising cardiologist, Dr. GAUTAM Verduzco was notified of images and patient's symptoms. Dr. Verduzco reviewed images and after consulting with patient decided to admit patient to ED with eventual transfer to [facility]. RN called to notify ED and report provided to charge RN. Patient agreeable to plan.  Kalyani Farmer RN  Cardiology Special Procedures

## 2021-06-23 ENCOUNTER — RECORDS - HEALTHEAST (OUTPATIENT)
Dept: ADMINISTRATIVE | Facility: OTHER | Age: 66
End: 2021-06-23

## 2021-06-23 ENCOUNTER — RECORDS - HEALTHEAST (OUTPATIENT)
Dept: SCHEDULING | Facility: CLINIC | Age: 66
End: 2021-06-23

## 2021-06-23 DIAGNOSIS — E21.3 HYPERPARATHYROIDISM, UNSPECIFIED (H): ICD-10-CM

## 2021-06-26 NOTE — PROGRESS NOTES
Progress Notes by Ed Bloom MD at 10/9/2018  2:50 PM     Author: Ed Bloom MD Service: -- Author Type: Physician    Filed: 10/9/2018  3:18 PM Encounter Date: 10/9/2018 Status: Signed    : Ed Bloom MD (Physician)           Click to link to [facility] HEART CARE NOTE    Thank you, Dr. Power, for asking us to see Ysabel Tucker at the [facility].      Assessment/Recommendations   Patient with known vascular disease who is stable at this time after percutaneous intervention as noted above.  I have encouraged her to maintain an active lifestyle and to exercise 20-30 minutes at least 5 times a week which should be an increase for her.  She will take that under advisement.    We will continue her on dual antiplatelet therapy and given her multiple procedures may consider this is a lifetime treatment.  I would like to see her back in July which should be 1 year out from her procedure and we can discuss antiplatelet therapy at that time.    Her blood pressure is at goal.    She will check a fasting lipid panel when she sees her primary care doctor within the next 1-2 weeks.    Thank you for allowing us to participate her care.         History of Present Illness    Ms. Ysabel Tucker is a 63 y.o. female with known coronary artery disease and multiple previous percutaneous interventions.  She also had Takasubo on one occasion but did have good recovery of her left ventricular systolic function.  Recently she was having more symptoms of chest heaviness and was scheduled for a pharmacologic nuclear study.  She was having more dramatic symptoms at that time was actually transferred from [facility] to [facility] and underwent percutaneous intervention by Dr. Camacho.    Exertional angina and abnormal high risk stress test yesterday. Complex history including CABG 3v in 1995 (LIMA-LAD, SVG-D1, SVG-OM2) with failed stenting of the  left subclavian artery x 2, now with a left carotid to subclavian artery bypass. Extensive stenting of the LAD and RCA, but none in the past several years. SVG to diagonal occluded on last angiogram.  Minimal instent restenosis of the proximal and mid LAD, with severe distal edge restenosis in the distal LAD. Lesion stented with a Synergy TERE with good angiographic results. First diagonal occluded. Vein to diagonal and LIMA not injected.  Severe proximal, tortuous circumflex disease. Small caliber artery. OM-2 flush occluded and fed by a moderately diseased vein graft. FFR of the graft was not flow limiting.  Severe de kyle mid RCA disease, between the proximal and mid RCA stents, treated with a Synergy TERE with good results. Moderate distal circumflex instent restenosis. Severe disease in a larger first PL branch treated with balloon angioplasty with good results.  LV EDP normal  LV EF around 40% with anterior-lateral akinesis and severe apical hypokinesis.  Estimated blood loss was <20 ml.    She has been feeling well since that time although feels like she still a little bit more fatigued than her baseline.  She has not had any chest discomfort.  She has been exercising on a Nordic track like machine that she has at home for 30 minutes a couple of times a week.  This does not provoke any anginal symptoms.  She denies orthopnea, paroxysmal nocturnal dyspnea, peripheral edema, syncope or near syncopal episodes.  Her LDL cholesterol was 70 and she was taking 10 mg of Lipitor in July and this was increased to 40 mg of Lipitor.  She has not had a recheck of her lipid panel.         Physical Examination Review of Systems   Vitals:    10/09/18 1449   BP: 128/72   Pulse: 60   Resp: 16     Body mass index is 25.69 kg/(m^2).  Wt Readings from Last 3 Encounters:   10/09/18 145 lb (65.8 kg)   08/02/18 150 lb (68 kg)   07/19/18 149 lb 3.2 oz (67.7 kg)     General Appearance:   Alert, cooperative and in no acute distress.    ENT/Mouth: Oral mucuos membranes pink and moist .      EYES:  No scleral icterus. No Xanthelasma.    Neck: JVP normal. No Hepatojugular reflux. Thyroid not visualized   Chest/Lungs:   Lungs are clear to auscultation, equal chest wall expansion    Cardiovascular:   S1, S2 without murmur ,clicks or rubs. Brachial, radial and posterior tibial pulses are intact and symetric.  Soft left carotid bruits noted   Abdomen:  Nontender. BS+.       Extremities: No cyanosis, clubbing or edema   Skin: no xanthelasma, warm.    Psych: Appropriate affect.   Neurologic: normal gait, normal  bilateral, no tremors        General: WNL  Eyes: WNL  Ears/Nose/Throat: WNL  Lungs: WNL  Heart: Irregular Heartbeat  Stomach: WNL  Bladder: WNL  Muscle/Joints: Joint Pain  Skin: WNL  Nervous System: Daytime Sleepiness  Mental Health: WNL     Blood: Easy Bruising     Medical History  Surgical History Family History Social History   Past Medical History:   Diagnosis Date     Arthritis      CHF (congestive heart failure) (H)      Chronic systolic heart failure (H)      Coronary artery disease      Diabetes mellitus (H)      Diastolic CHF, acute on chronic (H) 1/16/2016     History of anesthesia complications     nausea and vomiting     History of transfusion      Hyperlipidemia      Hypertension      Hypothyroid      Low magnesium levels      Low vitamin B12 level      Myocardial infarction (H)      PONV (postoperative nausea and vomiting)      Subclavian artery stenosis (H)     Past Surgical History:   Procedure Laterality Date     APPENDECTOMY       CARDIAC CATHETERIZATION  07/18/2018    additional 2 stents     CARDIAC SURGERY       CARPAL TUNNEL RELEASE       CORONARY ARTERY BYPASS GRAFT  April/1995    x3     CV CORONARY ANGIOGRAM N/A 7/18/2018    Procedure: Coronary Angiogram;  Surgeon: Patricia Camacho MD;  Location: [facility] Cath Lab;  Service:      CV LEFT HEART CATHETERIZATION WITH LEFT VENTRICULOGRAM N/A 7/18/2018    Procedure: Left  Heart Catheterization with Left Ventriculogram;  Surgeon: Patricia Camacho MD;  Location: [facility] Cath Lab;  Service:      ESOPHAGOGASTRODUODENOSCOPY N/A 4/12/2016    Procedure: ESOPHAGOGASTRODUODENOSCOPY;  Surgeon: Karlo Swartz MD;  Location: [facility] GI;  Service:      ESOPHAGOGASTRODUODENOSCOPY N/A 6/28/2016    Procedure: ESOPHAGOGASTRODUODENOSCOPY;  Surgeon: Robby Hoffman MD;  Location: [facility];  Service:      GASTROPLASTY VERTICAL BANDED  2000     HYSTERECTOMY       JOINT REPLACEMENT       OOPHORECTOMY  2005     MS APPENDECTOMY      Description: Appendectomy;  Recorded: 06/27/2012;     MS CABG, VEIN, SINGLE      Description: CABG (CABG);  Recorded: 06/27/2012;     MS INCISE FINGER TENDON SHEATH N/A 10/9/2014    Procedure: LEFT INDEX FINGER TRIGGER RELEASE, POSSIBLE KENALOG INJECTION RIGHT MIDDLE FINGER;  Surgeon: Rajan Ashby MD;  Location: [facility];  Service: Hand     MS LAP, ANGELA RESTRICT PROC, LONGITUDINAL GASTRECTOMY N/A 11/21/2016    Procedure: CONVERT VERTICAL BANDED GASTROPLASTY TO LAPAROSCOPIC SLEEVE GASTRECTOMY LYSIS OF ADHESIONS;  Surgeon: Robby Hoffman MD;  Location: [facility];  Service: General     MS REMOVAL OF TONSILS,<13 Y/O      Description: Tonsillectomy;  Recorded: 06/27/2012;     MS THROMBOENDARTECTMY NECK,NECK INCIS Left 6/18/2014    Procedure: LEFT CAROTID SUBCLAVIAN BYPASS (Room 4012);  Surgeon: Stanton Nunn MD;  Location: [facility];  Service: General     MS TOTAL ABDOM HYSTERECTOMY      Description: Hysterectomy;  Recorded: 06/27/2012;     TOTAL HIP ARTHROPLASTY Left      VASCULAR SURGERY      Family History   Problem Relation Age of Onset     Diabetes Mother      Hyperlipidemia Mother      Hearing loss Mother      Hyperlipidemia Father     Social History     Social History     Marital status:      Spouse name: N/A     Number of children: N/A     Years of education: N/A     Occupational History     Not on file.     Social  History Main Topics     Smoking status: Former Smoker     Packs/day: 1.00     Years: 30.00     Quit date: 10/7/2007     Smokeless tobacco: Never Used     Alcohol use No     Drug use: No     Sexual activity: No     Other Topics Concern     Not on file     Social History Narrative          Medications  Allergies   Current Outpatient Prescriptions   Medication Sig Dispense Refill     acetaminophen (TYLENOL) 500 MG tablet Take 1,000 mg by mouth 2 (two) times a day.       amLODIPine (NORVASC) 10 MG tablet Take 10 mg by mouth bedtime.        aspirin 81 mg chewable tablet Chew 1 tablet (81 mg total) daily.  0     atorvastatin (LIPITOR) 40 MG tablet Take 1 tablet (40 mg total) by mouth at bedtime. 30 tablet 0     clopidogrel (PLAVIX) 75 mg tablet Take 1 tablet (75 mg total) by mouth daily. 30 tablet 0     cyanocobalamin, vitamin B-12, 1,000 mcg Subl Place 1,000 mcg under the tongue daily.       diphenhydrAMINE (BENADRYL) 25 mg capsule Take 50 mg by mouth at bedtime. Itching from tramadol       furosemide (LASIX) 20 MG tablet Take 20 mg by mouth daily.        ibuprofen (ADVIL,MOTRIN) 200 MG tablet Take 400 mg by mouth every 6 (six) hours as needed for pain.       levothyroxine (SYNTHROID, LEVOTHROID) 75 MCG tablet Take 75 mcg by mouth Daily at 6:00 am.       metFORMIN (GLUCOPHAGE) 500 MG tablet Take 1 tablet (500 mg total) by mouth 2 (two) times a day with meals. 60 tablet 0     metoprolol tartrate (LOPRESSOR) 50 MG tablet Take 1 tablet (50 mg total) by mouth 2 (two) times a day. 60 tablet 0     multivitamin therapeutic tablet Take 1 tablet by mouth daily.       nitroglycerin (NITROSTAT) 0.4 MG SL tablet Place 0.4 mg under the tongue every 5 (five) minutes as needed for chest pain.       ranitidine (ZANTAC) 150 MG tablet Take 150 mg by mouth at bedtime.       traMADol (ULTRAM) 50 mg tablet Take 100 mg by mouth 3 (three) times a day.        blood glucose meter (GLUCOMETER) Use 1 each As Directed as needed. Dispense glucometer  brand per patient's insurance at pharmacy discretion. 1 each 0     blood glucose test (CONTOUR NEXT STRIPS) strips Use 1 each As Directed as needed. Dispense brand per patient's insurance at pharmacy discretion. 100 each 0     calcium, as carbonate, (TUMS) 200 mg calcium (500 mg) chewable tablet Chew 1 tablet (200 mg total) 4 (four) times a day as needed.  0     generic lancets Use 1 each As Directed as needed. Dispense brand per patient's insurance at pharmacy discretion. 100 each 0     No current facility-administered medications for this visit.       Allergies   Allergen Reactions     Liquibid [Guaifenesin] Hives     Sulfa (Sulfonamide Antibiotics) Hives     Gadolinium-Containing Contrast Media Rash     From a previous angiogram     Latex Rash     Latex Rash         Lab Results    Chemistry/lipid CBC Cardiac Enzymes/BNP/TSH/INR   Lab Results   Component Value Date    CHOL 129 07/17/2018    HDL 40 (L) 07/17/2018    LDLCALC 70 07/17/2018    TRIG 96 07/17/2018    CREATININE 1.11 (H) 07/19/2018    BUN 15 07/19/2018    K 3.9 07/19/2018     07/19/2018     07/19/2018    CO2 23 07/19/2018    Lab Results   Component Value Date    WBC 7.2 07/17/2018    HGB 11.8 (L) 07/19/2018    HCT 40.2 07/17/2018    MCV 90 07/17/2018     07/19/2018    Lab Results   Component Value Date    CKMB 5 01/17/2016    TROPONINI 0.67 (HH) 07/19/2018     (H) 07/17/2018    TSH 3.86 06/01/2016    INR 0.92 07/17/2018

## 2021-06-26 NOTE — PROGRESS NOTES
Progress Notes by Ed Bloom MD at 7/3/2018  2:50 PM     Author: Ed Bloom MD Service: -- Author Type: Physician    Filed: 7/3/2018  3:37 PM Encounter Date: 7/3/2018 Status: Signed    : Ed Bloom MD (Physician)           Click to link to [facility] HEART CARE NOTE    Thank you, Dr. Power, for asking us to see Ysabel Tucker at the [facility].      Assessment/Recommendations   Patient with known coronary artery disease who has symptoms are quite consistent with exertional angina.  We talked about adding nitrate therapy but she has quite significant side effects with severe headaches with isosorbide.  We will therefore proceed to a pharmacologic nuclear study to see if there is significant areas of ischemia and have a low threshold for repeat coronary angiography, given her symptoms.    Thank you for allowing us to participate in her care.         History of Present Illness    Ms. Ysabel Tucker is a 63 y.o. female with known coronary artery disease who has had previous bypass surgery and previous angiography for chest discomfort.  She is also evaluated for broken heart syndrome and had complete normalization of her left ventricular ejection fraction after this was treated.  She has reported more chest discomfort and it is fairly consistent with physical activity.  She feels it as a chest pressure in her left side radiating to the left shoulder.  He goes away with rest.  She has not had syncopal or near syncopal episodes.  She denies palpitations.  She does get some mild peripheral edema only intermittently.  She denies orthopnea or paroxysmal nocturnal dyspnea.         Physical Examination Review of Systems   Vitals:    07/03/18 1510   BP: 110/64   Pulse: (!) 44   Resp: 16     Body mass index is 26.39 kg/(m^2).  Wt Readings from Last 3 Encounters:   07/03/18 149 lb (67.6 kg)   12/07/16 161 lb (73 kg)   11/23/16 174 lb 12.8 oz (79.3 kg)      General Appearance:   Alert, cooperative and in no acute distress.   ENT/Mouth: Oral mucuos membranes pink and moist .      EYES:  No scleral icterus. No Xanthelasma.    Neck: JVP normal. No Hepatojugular reflux. Thyroid not visualized   Chest/Lungs:   Lungs are clear to auscultation, equal chest wall expansion    Cardiovascular:   S1, S2 without murmur ,clicks or rubs. Brachial, radial and posterior tibial pulses are intact and symetric. No carotid bruits noted   Abdomen:  Nontender. BS+. No bruits.      Extremities: No cyanosis, clubbing or edema   Skin: no xanthelasma, warm.    Psych: Appropriate affect.   Neurologic: normal gait, normal  bilateral, no tremors        General: WNL  Eyes: WNL  Ears/Nose/Throat: WNL  Lungs: Shortness of Breath  Heart: Chest Pain, Arm Pain, Shortness of Breath with activity  Stomach: Heartburn, Nausea, WNL  Bladder: WNL  Muscle/Joints: Joint Pain, Muscle Weakness  Skin: WNL  Nervous System: Daytime Sleepiness  Mental Health: WNL     Blood: WNL     Medical History  Surgical History Family History Social History   Past Medical History:   Diagnosis Date   ? Arthritis    ? CHF (congestive heart failure) (H)    ? Chronic systolic heart failure (H)    ? Coronary artery disease    ? Diabetes mellitus (H)    ? Diastolic CHF, acute on chronic (H) 1/16/2016   ? History of anesthesia complications     nausea and vomiting   ? History of transfusion    ? Hyperlipidemia    ? Hypertension    ? Hypothyroid    ? Low magnesium levels    ? Low vitamin B12 level    ? Myocardial infarction    ? PONV (postoperative nausea and vomiting)    ? Subclavian artery stenosis (H)     Past Surgical History:   Procedure Laterality Date   ? APPENDECTOMY     ? CARDIAC SURGERY     ? CARPAL TUNNEL RELEASE     ? CORONARY ARTERY BYPASS GRAFT  April/1995    x3   ? ESOPHAGOGASTRODUODENOSCOPY N/A 4/12/2016    Procedure: ESOPHAGOGASTRODUODENOSCOPY;  Surgeon: Karlo Swartz MD;  Location: [facility];  Service:    ?  ESOPHAGOGASTRODUODENOSCOPY N/A 6/28/2016    Procedure: ESOPHAGOGASTRODUODENOSCOPY;  Surgeon: Robby Hoffman MD;  Location: [facility];  Service:    ? GASTROPLASTY VERTICAL BANDED  2000   ? HYSTERECTOMY     ? JOINT REPLACEMENT     ? OOPHORECTOMY  2005   ? W[place] APPENDECTOMY      Description: Appendectomy;  Recorded: 06/27/2012;   ? W[place] CABG, VEIN, SINGLE      Description: CABG (CABG);  Recorded: 06/27/2012;   ? W[place] INCISE FINGER TENDON SHEATH N/A 10/9/2014    Procedure: LEFT INDEX FINGER TRIGGER RELEASE, POSSIBLE KENALOG INJECTION RIGHT MIDDLE FINGER;  Surgeon: Rajan Ashby MD;  Location: [facility];  Service: Hand   ? W[place] LAP, ANGELA RESTRICT PROC, LONGITUDINAL GASTRECTOMY N/A 11/21/2016    Procedure: CONVERT VERTICAL BANDED GASTROPLASTY TO LAPAROSCOPIC SLEEVE GASTRECTOMY LYSIS OF ADHESIONS;  Surgeon: Robby Hoffman MD;  Location: [facility];  Service: General   ? W[place] REMOVAL OF TONSILS,<13 Y/O      Description: Tonsillectomy;  Recorded: 06/27/2012;   ? [place] THROMBOENDARTECTMY NECK,NECK INCIS Left 6/18/2014    Procedure: LEFT CAROTID SUBCLAVIAN BYPASS (Room 4012);  Surgeon: Stanton Nunn MD;  Location: [facility];  Service: General   ? [place] TOTAL ABDOM HYSTERECTOMY      Description: Hysterectomy;  Recorded: 06/27/2012;   ? TOTAL HIP ARTHROPLASTY Left    ? VASCULAR SURGERY      Family History   Problem Relation Age of Onset   ? Diabetes Mother    ? Hyperlipidemia Mother    ? Hearing loss Mother    ? Hyperlipidemia Father     Social History     Social History   ? Marital status:      Spouse name: N/A   ? Number of children: N/A   ? Years of education: N/A     Occupational History   ? Not on file.     Social History Main Topics   ? Smoking status: Former Smoker     Packs/day: 1.00     Years: 30.00     Quit date: 10/7/2007   ? Smokeless tobacco: Never Used   ? Alcohol use No   ? Drug use: No   ? Sexual activity: No     Other Topics Concern   ? Not on file     Social History  Narrative          Medications  Allergies   Current Outpatient Prescriptions   Medication Sig Dispense Refill   ? amLODIPine (NORVASC) 10 MG tablet Take 10 mg by mouth bedtime.      ? atorvastatin (LIPITOR) 10 MG tablet Take 10 mg by mouth bedtime.     ? blood glucose meter (GLUCOMETER) Use 1 each As Directed as needed. Dispense glucometer brand per patient's insurance at pharmacy discretion. 1 each 0   ? blood glucose test (CONTOUR NEXT STRIPS) strips Use 1 each As Directed as needed. Dispense brand per patient's insurance at pharmacy discretion. 100 each 0   ? clopidogrel (PLAVIX) 75 mg tablet Take 33 mg by mouth daily.      ? cyanocobalamin, vitamin B-12, 1,000 mcg Subl Place 1,000 mcg under the tongue daily.     ? furosemide (LASIX) 20 MG tablet Take 20 mg by mouth daily.      ? generic lancets Use 1 each As Directed as needed. Dispense brand per patient's insurance at pharmacy discretion. 100 each 0   ? levothyroxine (SYNTHROID, LEVOTHROID) 50 MCG tablet Take 75 mcg by mouth daily.      ? metFORMIN (GLUCOPHAGE) 500 MG tablet Take 1 tablet (500 mg total) by mouth 2 (two) times a day with meals. 60 tablet 0   ? metoprolol tartrate (LOPRESSOR) 50 MG tablet Take 1 tablet (50 mg total) by mouth 2 (two) times a day. 60 tablet 0   ? nitroglycerin (NITROSTAT) 0.4 MG SL tablet Place 0.4 mg under the tongue every 5 (five) minutes as needed for chest pain.     ? pediatric multivitamin (FLINTSTONES) Chew chewable tablet Chew 1 tablet 2 (two) times a day.     ? traMADol (ULTRAM) 50 mg tablet Take  mg by mouth every 6 (six) hours as needed.      ? ursodiol (ACTIGALL) 300 mg capsule Take 1 capsule (300 mg total) by mouth 2 (two) times a day. Start 2 wks after surgery. Do not open capsule. Swallow whole with warm water. 180 capsule 1     No current facility-administered medications for this visit.       Allergies   Allergen Reactions   ? Liquibid [Guaifenesin] Hives   ? Sulfa (Sulfonamide Antibiotics) Hives   ? Latex  Rash         Lab Results    Chemistry/lipid CBC Cardiac Enzymes/BNP/TSH/INR   Lab Results   Component Value Date    CHOL 142 01/16/2016    HDL 48 (L) 01/16/2016    LDLCALC 76 01/16/2016    TRIG 90 01/16/2016    CREATININE 0.87 11/22/2016    BUN 14 11/22/2016    K 4.2 11/22/2016     11/22/2016     11/22/2016    CO2 27 11/22/2016    Lab Results   Component Value Date    WBC 6.6 11/10/2016    HGB 13.5 11/10/2016    HCT 40.8 11/10/2016    MCV 93 11/10/2016     11/10/2016    Lab Results   Component Value Date    CKMB 5 01/17/2016    TROPONINI 2.05 (HH) 01/17/2016     (H) 11/22/2016    TSH 3.86 06/01/2016    INR 0.90 11/10/2016

## 2021-06-26 NOTE — PROGRESS NOTES
Progress Notes by Christina Albarado CNP at 8/2/2018  1:30 PM     Author: Christina Albarado CNP Service: -- Author Type: Nurse Practitioner    Filed: 8/2/2018  3:39 PM Encounter Date: 8/2/2018 Status: Signed    : Christina Albarado CNP (Nurse Practitioner)                 Click to link to [facility] HEART [facility] NOTE      Assessment/Recommendations   1.  Coronary artery disease: She was hospitalized July 17 - July 19 after failing a stress test.  Troponins were positive.  She has had fatigue, shortness of breath, and chest pain for a few months.  Coronary angiogram on July 18 with drug-eluting stent to distal LAD restenosis, drug-eluting stent to mid RCA, and balloon angioplasty of PL branch.  Dual antiplatelet therapy is being used with aspirin and Plavix.  We discussed the importance of antiplatelet therapy and talking with her cardiologist prior to stopping these medications for any reason.     Risk factor modification and lifestyle management topics were discussed including managing comorbidities, heart healthy diet and exercise.  She is not interested in cardiac rehab will begin her on exercise routine.    2.  Dyslipidemia: Ysabel Tucker is on high intensity statin therapy with atorvastatin 40 mg daily.  Her dose was increased during hospitalization.  Her LDL is 70.  We discussed a diet low in saturated fat, weight loss, and exercise along with medication for better control of cholesterol.     3.  Ischemic cardiomyopathy, ejection fraction 45%: She has no symptoms of acute heart failure.  She weighs herself daily and limits salt in her diet.  She takes Lasix 20 mg daily.    Ysabel will follow up with Dr. Bloom on October 9.     History of Present Illness    Ysabel Tucker is seen at [facility] for post coronary intervention follow up.  She has a history of coronary artery bypass surgery, 18 stents, ischemic cardiomyopathy, and diabetes.  She was  hospitalized July 17 - July 19 after failing a stress test.  Troponins were positive.  She has had fatigue, shortness of breath, and chest pain for a few months.  Coronary angiogram on July 18 with drug-eluting stent to distal LAD restenosis, drug-eluting stent to mid RCA, and balloon angioplasty of PL branch.  Dual antiplatelet therapy is being used with aspirin and Plavix.    She denies any chest pain or shortness of breath since PCI.  Her fatigue is improving.  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  She denies lightheadedness and lower extremity edema.      She plans to start exercising a few days a week.  She is not interested in cardiac rehab.     Physical Examination Review of Systems   Vitals:    08/02/18 1348   BP: 138/64   Pulse:    Resp:      Body mass index is 26.57 kg/(m^2).  Wt Readings from Last 3 Encounters:   08/02/18 150 lb (68 kg)   07/19/18 149 lb 3.2 oz (67.7 kg)   07/17/18 147 lb (66.7 kg)       General Appearance:     Alert, cooperative and in no acute distress.   ENT/Mouth: membranes moist, no oral lesions or bleeding gums.      EYES:  no scleral icterus, normal conjunctivae   Neck: no carotid bruits or thyromegaly   Chest/Lungs:   lungs are clear to auscultation, no rales or wheezing, respirations unlabored   Cardiovascular:   Regular. Normal first and second heart sounds with no murmurs, rubs, or gallops; the carotid, radial and posterior tibial pulses are intact, no edema bilateral lower extremities    Abdomen:  Soft, nontender, nondistended, bowel sounds present   Extremities: no cyanosis or clubbing   Skin:  Neurologic: warm, dry.  mood and affect are appropriate, alert and oriented x3     Puncture Site:  Declined assessment of right femoral puncture site but states no pain or bruising.  Radial pulses and Pedal pulses intact and symmetrical.  CMS intact.      General: WNL  Eyes: WNL  Ears/Nose/Throat: WNL  Lungs: WNL  Heart: WNL  Stomach: Constipation,  Diarrhea, Heartburn, Nausea  Bladder: WNL  Muscle/Joints: Joint Pain  Skin: Rash  Nervous System: WNL  Mental Health: WNL     Blood: WNL     Medical History  Surgical History Family History Social History   Past Medical History:   Diagnosis Date   ? Arthritis    ? CHF (congestive heart failure) (H)    ? Chronic systolic heart failure (H)    ? Coronary artery disease    ? Diabetes mellitus (H)    ? Diastolic CHF, acute on chronic (H) 1/16/2016   ? History of anesthesia complications     nausea and vomiting   ? History of transfusion    ? Hyperlipidemia    ? Hypertension    ? Hypothyroid    ? Low magnesium levels    ? Low vitamin B12 level    ? Myocardial infarction    ? PONV (postoperative nausea and vomiting)    ? Subclavian artery stenosis (H)     Past Surgical History:   Procedure Laterality Date   ? APPENDECTOMY     ? CARDIAC CATHETERIZATION  07/18/2018    additional 2 stents   ? CARDIAC SURGERY     ? CARPAL TUNNEL RELEASE     ? CORONARY ARTERY BYPASS GRAFT  April/1995    x3   ? CV CORONARY ANGIOGRAM N/A 7/18/2018    Procedure: Coronary Angiogram;  Surgeon: Patricia Camacho MD;  Location: [facility];  Service:    ? CV LEFT HEART CATHETERIZATION WITH LEFT VENTRICULOGRAM N/A 7/18/2018    Procedure: Left Heart Catheterization with Left Ventriculogram;  Surgeon: Patricia Camacho MD;  Location: [facility];  Service:    ? ESOPHAGOGASTRODUODENOSCOPY N/A 4/12/2016    Procedure: ESOPHAGOGASTRODUODENOSCOPY;  Surgeon: Karlo Swartz MD;  Location: [facility];  Service:    ? ESOPHAGOGASTRODUODENOSCOPY N/A 6/28/2016    Procedure: ESOPHAGOGASTRODUODENOSCOPY;  Surgeon: Robby Hoffman MD;  Location: [facility];  Service:    ? GASTROPLASTY VERTICAL BANDED  2000   ? HYSTERECTOMY     ? JOINT REPLACEMENT     ? OOPHORECTOMY  2005   ? DC APPENDECTOMY      Description: Appendectomy;  Recorded: 06/27/2012;   ? DC CABG, VEIN, SINGLE      Description: CABG (CABG);  Recorded: 06/27/2012;   ? DC INCISE FINGER  TENDON SHEATH N/A 10/9/2014    Procedure: LEFT INDEX FINGER TRIGGER RELEASE, POSSIBLE KENALOG INJECTION RIGHT MIDDLE FINGER;  Surgeon: Rajan Ashby MD;  Location: [facility] OR;  Service: Hand   ? [place] LAP, ANGELA RESTRICT PROC, LONGITUDINAL GASTRECTOMY N/A 11/21/2016    Procedure: CONVERT VERTICAL BANDED GASTROPLASTY TO LAPAROSCOPIC SLEEVE GASTRECTOMY LYSIS OF ADHESIONS;  Surgeon: Robby Hoffman MD;  Location: [facility];  Service: General   ? [place] REMOVAL OF TONSILS,<11 Y/O      Description: Tonsillectomy;  Recorded: 06/27/2012;   ? [place] THROMBOENDARTECTMY NECK,NECK INCIS Left 6/18/2014    Procedure: LEFT CAROTID SUBCLAVIAN BYPASS (Room 4012);  Surgeon: Stanton Nunn MD;  Location: [facility];  Service: General   ? [place] TOTAL ABDOM HYSTERECTOMY      Description: Hysterectomy;  Recorded: 06/27/2012;   ? TOTAL HIP ARTHROPLASTY Left    ? VASCULAR SURGERY      Family History   Problem Relation Age of Onset   ? Diabetes Mother    ? Hyperlipidemia Mother    ? Hearing loss Mother    ? Hyperlipidemia Father     Social History     Social History   ? Marital status:      Spouse name: N/A   ? Number of children: N/A   ? Years of education: N/A     Occupational History   ? Not on file.     Social History Main Topics   ? Smoking status: Former Smoker     Packs/day: 1.00     Years: 30.00     Quit date: 10/7/2007   ? Smokeless tobacco: Never Used   ? Alcohol use No   ? Drug use: No   ? Sexual activity: No     Other Topics Concern   ? Not on file     Social History Narrative          Medications  Allergies   Current Outpatient Prescriptions   Medication Sig Dispense Refill   ? acetaminophen (TYLENOL) 500 MG tablet Take 1,000 mg by mouth 2 (two) times a day.     ? amLODIPine (NORVASC) 10 MG tablet Take 10 mg by mouth bedtime.      ? aspirin 81 mg chewable tablet Chew 1 tablet (81 mg total) daily.  0   ? atorvastatin (LIPITOR) 40 MG tablet Take 1 tablet (40 mg total) by mouth at bedtime. 30 tablet 0    ? blood glucose meter (GLUCOMETER) Use 1 each As Directed as needed. Dispense glucometer brand per patient's insurance at pharmacy discretion. 1 each 0   ? blood glucose test (CONTOUR NEXT STRIPS) strips Use 1 each As Directed as needed. Dispense brand per patient's insurance at pharmacy discretion. 100 each 0   ? calcium, as carbonate, (TUMS) 200 mg calcium (500 mg) chewable tablet Chew 1 tablet (200 mg total) 4 (four) times a day as needed.  0   ? clopidogrel (PLAVIX) 75 mg tablet Take 1 tablet (75 mg total) by mouth daily. 30 tablet 0   ? cyanocobalamin, vitamin B-12, 1,000 mcg Subl Place 1,000 mcg under the tongue daily.     ? diphenhydrAMINE (BENADRYL) 25 mg capsule Take 50 mg by mouth at bedtime. Itching from tramadol     ? furosemide (LASIX) 20 MG tablet Take 20 mg by mouth daily.      ? generic lancets Use 1 each As Directed as needed. Dispense brand per patient's insurance at pharmacy discretion. 100 each 0   ? ibuprofen (ADVIL,MOTRIN) 200 MG tablet Take 400 mg by mouth every 6 (six) hours as needed for pain.     ? levothyroxine (SYNTHROID, LEVOTHROID) 75 MCG tablet Take 75 mcg by mouth Daily at 6:00 am.     ? metFORMIN (GLUCOPHAGE) 500 MG tablet Take 1 tablet (500 mg total) by mouth 2 (two) times a day with meals. 60 tablet 0   ? metoprolol tartrate (LOPRESSOR) 50 MG tablet Take 1 tablet (50 mg total) by mouth 2 (two) times a day. 60 tablet 0   ? multivitamin therapeutic tablet Take 1 tablet by mouth daily.     ? nitroglycerin (NITROSTAT) 0.4 MG SL tablet Place 0.4 mg under the tongue every 5 (five) minutes as needed for chest pain.     ? ranitidine (ZANTAC) 150 MG tablet Take 150 mg by mouth at bedtime.     ? traMADol (ULTRAM) 50 mg tablet Take 100 mg by mouth 3 (three) times a day.        No current facility-administered medications for this visit.       Allergies   Allergen Reactions   ? Liquibid [Guaifenesin] Hives   ? Sulfa (Sulfonamide Antibiotics) Hives   ? Gadolinium-Containing Contrast Media  Rash     From a previous angiogram   ? Latex Rash   ? Latex Rash         Lab Results    Chemistry CBC BNP   Lab Results   Component Value Date    CREATININE 1.11 (H) 07/19/2018    BUN 15 07/19/2018     07/19/2018    K 3.9 07/19/2018     07/19/2018    CO2 23 07/19/2018     Creatinine (mg/dL)   Date Value   07/19/2018 1.11 (H)   07/18/2018 1.10   07/17/2018 1.19 (H)   11/22/2016 0.87    Lab Results   Component Value Date    WBC 7.2 07/17/2018    HGB 11.8 (L) 07/19/2018    HCT 40.2 07/17/2018    MCV 90 07/17/2018     07/19/2018    Lab Results   Component Value Date     (H) 07/17/2018     BNP (pg/mL)   Date Value   07/17/2018 445 (H)   11/22/2016 417 (H)   01/16/2016 1917 (H)          25 minutes were spent with the patient with greater than 50% spent on education and counseling.      Christina Albarado, [facility] [place] [facility]

## 2021-07-13 ENCOUNTER — RECORDS - HEALTHEAST (OUTPATIENT)
Dept: ADMINISTRATIVE | Facility: CLINIC | Age: 66
End: 2021-07-13

## 2021-07-21 ENCOUNTER — RECORDS - HEALTHEAST (OUTPATIENT)
Dept: ADMINISTRATIVE | Facility: CLINIC | Age: 66
End: 2021-07-21

## (undated) DEVICE — DRSG ADAPTIC 3X3" 6112

## (undated) DEVICE — GLOVE PROTEXIS W/NEU-THERA 7.0  2D73TE70

## (undated) DEVICE — GLOVE PROTEXIS BLUE W/NEU-THERA 7.5  2D73EB75

## (undated) DEVICE — DRAPE STERI TOWEL SM 1000

## (undated) DEVICE — BLADE KNIFE BEAVER 376700

## (undated) DEVICE — SU ETHILON 4-0 PS-2 18" 1667G

## (undated) DEVICE — PREP SKIN SCRUB TRAY 4461A

## (undated) DEVICE — GOWN IMPERVIOUS SPECIALTY XLG/XLONG 32474

## (undated) DEVICE — SOL WATER IRRIG 1000ML BOTTLE 07139-09

## (undated) DEVICE — BNDG ELASTIC 2"X5YDS UNSTERILE 6611-20

## (undated) DEVICE — DRAPE SHEET REV FOLD 3/4 9349

## (undated) DEVICE — PACK HAND

## (undated) DEVICE — DECANTER VIAL 2006S

## (undated) RX ORDER — LIDOCAINE HYDROCHLORIDE 10 MG/ML
INJECTION, SOLUTION INFILTRATION; PERINEURAL
Status: DISPENSED
Start: 2017-08-01

## (undated) RX ORDER — GINSENG 100 MG
CAPSULE ORAL
Status: DISPENSED
Start: 2017-08-01

## (undated) RX ORDER — BUPIVACAINE HYDROCHLORIDE 5 MG/ML
INJECTION, SOLUTION PERINEURAL
Status: DISPENSED
Start: 2017-08-01